# Patient Record
Sex: FEMALE | Race: WHITE | Employment: OTHER | URBAN - METROPOLITAN AREA
[De-identification: names, ages, dates, MRNs, and addresses within clinical notes are randomized per-mention and may not be internally consistent; named-entity substitution may affect disease eponyms.]

---

## 2017-01-02 ENCOUNTER — ANESTHESIA EVENT (OUTPATIENT)
Dept: CARDIAC SURGERY | Facility: HOSPITAL | Age: 67
End: 2017-01-02

## 2017-01-03 ENCOUNTER — ANESTHESIA (OUTPATIENT)
Dept: CARDIAC SURGERY | Facility: HOSPITAL | Age: 67
End: 2017-01-03

## 2017-01-03 ENCOUNTER — SURGERY (OUTPATIENT)
Age: 67
End: 2017-01-03

## 2017-01-03 NOTE — ANESTHESIA PREPROCEDURE EVALUATION
PRE-OP EVALUATION    Patient Name: Joana Esparza    Pre-op Diagnosis: varicose veins of bilateral lower extremities with pain    Procedure(s):  left lower extremity stab phlebectomy  SA av'd per office    Surgeon(s) and Role:     * Carl Traore MD - (LOTRISONE) 1-0.05 % External Cream Apply 1 Application topically 2 (two) times daily. Disp: 1 Tube Rfl: 3   aspirin 81 MG Oral Tab Take 81 mg by mouth daily. Disp:  Rfl:    Calcium Carbonate-Vitamin D (CALCIUM + D OR) Take 2 tablets by mouth daily.    Disp Available pre-op labs reviewed. Lab Results  Component Value Date   WBC 6.6 12/27/2016   WBC 7.06 11/21/2016   RBC 4.94 11/21/2016   HGB 14.4 11/21/2016   HCT 43.0 11/21/2016   MCV 87 12/27/2016   MCV 87.0 11/21/2016   MCH 28.8 12/27/2016   MCH 29. 1

## 2017-01-03 NOTE — ANESTHESIA POSTPROCEDURE EVALUATION
[address] Patient Status:  Outpatient in a Bed   Age/Gender 73year old female MRN [id number]   Location [address] [facility] Attending Ela Valente MD   Hosp Day # 0 PCP Thad Dakin, MD       Anesthesia

## 2017-01-30 PROBLEM — I83.93 SPIDER VEINS OF BOTH LOWER EXTREMITIES: Status: ACTIVE | Noted: 2017-01-30

## 2017-09-12 PROBLEM — R31.0 GROSS HEMATURIA: Status: ACTIVE | Noted: 2017-09-12

## 2017-09-12 PROBLEM — M79.5 FOREIGN BODY (FB) IN SOFT TISSUE: Status: ACTIVE | Noted: 2017-09-12

## 2017-09-12 PROCEDURE — 81001 URINALYSIS AUTO W/SCOPE: CPT | Performed by: FAMILY MEDICINE

## 2017-09-12 PROCEDURE — 87086 URINE CULTURE/COLONY COUNT: CPT | Performed by: FAMILY MEDICINE

## 2017-09-26 PROCEDURE — 81003 URINALYSIS AUTO W/O SCOPE: CPT | Performed by: FAMILY MEDICINE

## 2017-09-26 PROCEDURE — 87086 URINE CULTURE/COLONY COUNT: CPT | Performed by: FAMILY MEDICINE

## 2017-10-16 PROBLEM — D21.11: Status: ACTIVE | Noted: 2017-10-16

## 2017-10-23 PROBLEM — M79.641 RIGHT HAND PAIN: Status: ACTIVE | Noted: 2017-10-23

## 2017-11-29 PROBLEM — M65.321 TRIGGER FINGER, RIGHT INDEX FINGER: Status: ACTIVE | Noted: 2017-11-29

## 2017-11-29 PROBLEM — R20.2 RIGHT HAND PARESTHESIA: Status: ACTIVE | Noted: 2017-11-29

## 2018-01-31 PROBLEM — G56.21 ULNAR NEUROPATHY OF RIGHT UPPER EXTREMITY: Status: ACTIVE | Noted: 2018-01-31

## 2018-05-31 PROBLEM — R07.89 STERNUM PAIN: Status: ACTIVE | Noted: 2018-05-31

## 2018-05-31 PROBLEM — M25.512 ACUTE PAIN OF BOTH SHOULDERS: Status: ACTIVE | Noted: 2018-05-31

## 2018-05-31 PROBLEM — M25.511 ACUTE PAIN OF BOTH SHOULDERS: Status: ACTIVE | Noted: 2018-05-31

## 2018-05-31 PROCEDURE — 86200 CCP ANTIBODY: CPT | Performed by: FAMILY MEDICINE

## 2018-05-31 PROCEDURE — 85652 RBC SED RATE AUTOMATED: CPT | Performed by: FAMILY MEDICINE

## 2018-07-19 PROBLEM — M75.42 IMPINGEMENT SYNDROME OF BOTH SHOULDERS: Status: ACTIVE | Noted: 2018-07-19

## 2018-07-19 PROBLEM — M75.21 BICEPS TENDINITIS ON RIGHT: Status: ACTIVE | Noted: 2018-07-19

## 2018-07-19 PROBLEM — M75.111 PARTIAL TEAR OF RIGHT ROTATOR CUFF: Status: ACTIVE | Noted: 2018-07-19

## 2018-07-19 PROBLEM — M75.41 IMPINGEMENT SYNDROME OF BOTH SHOULDERS: Status: ACTIVE | Noted: 2018-07-19

## 2018-07-19 PROBLEM — M75.22 BICEPS TENDINITIS, LEFT: Status: ACTIVE | Noted: 2018-07-19

## 2018-10-16 PROBLEM — R35.0 FREQUENT URINATION: Status: ACTIVE | Noted: 2018-10-16

## 2018-10-16 PROBLEM — R10.31 RIGHT LOWER QUADRANT ABDOMINAL PAIN: Status: ACTIVE | Noted: 2018-10-16

## 2018-10-16 PROCEDURE — 87086 URINE CULTURE/COLONY COUNT: CPT | Performed by: FAMILY MEDICINE

## 2018-10-16 PROCEDURE — 81001 URINALYSIS AUTO W/SCOPE: CPT | Performed by: FAMILY MEDICINE

## 2018-11-01 PROCEDURE — 81003 URINALYSIS AUTO W/O SCOPE: CPT | Performed by: FAMILY MEDICINE

## 2019-10-21 PROBLEM — M85.88 OSTEOPENIA OF LUMBAR SPINE: Status: ACTIVE | Noted: 2019-10-21

## 2019-10-21 PROBLEM — I67.1 CEREBRAL ANEURYSM WITHOUT RUPTURE: Status: ACTIVE | Noted: 2019-10-21

## 2019-10-21 PROBLEM — I67.1 CEREBRAL ANEURYSM WITHOUT RUPTURE (HCC): Status: ACTIVE | Noted: 2019-10-21

## 2019-10-21 PROBLEM — Z92.89 HOSPITALIZATION WITHIN LAST 30 DAYS: Status: ACTIVE | Noted: 2019-10-21

## 2019-10-21 PROBLEM — G51.0 RIGHT-SIDED BELL'S PALSY: Status: ACTIVE | Noted: 2019-10-21

## 2020-10-27 PROBLEM — G40.909 SEIZURE DISORDER (HCC): Status: ACTIVE | Noted: 2020-10-27

## 2020-10-27 PROBLEM — K86.1 OTHER CHRONIC PANCREATITIS (HCC): Status: ACTIVE | Noted: 2020-10-27

## 2021-06-23 PROBLEM — K52.9 CHRONIC DIARRHEA: Status: ACTIVE | Noted: 2021-06-23

## 2021-06-23 PROBLEM — Z01.818 PRE-OP TESTING: Status: ACTIVE | Noted: 2021-06-23

## 2021-06-23 PROBLEM — K22.70 BARRETT'S ESOPHAGUS WITHOUT DYSPLASIA: Status: ACTIVE | Noted: 2021-06-23

## 2021-06-23 PROBLEM — Z01.818 PRE-OP EVALUATION: Status: ACTIVE | Noted: 2021-06-23

## 2021-12-17 PROBLEM — K11.20 SIALADENITIS: Status: ACTIVE | Noted: 2021-12-17

## 2021-12-17 PROBLEM — L03.213 PERIORBITAL CELLULITIS OF LEFT EYE: Status: ACTIVE | Noted: 2021-12-17

## 2021-12-17 PROBLEM — Z86.19 FREQUENT INFECTIONS: Status: ACTIVE | Noted: 2021-12-17

## 2022-04-07 PROBLEM — M06.4 INFLAMMATORY POLYARTHRITIS (HCC): Status: ACTIVE | Noted: 2022-04-07

## 2024-04-11 RX ORDER — CHLORDIAZEPOXIDE HYDROCHLORIDE AND CLIDINIUM BROMIDE 5; 2.5 MG/1; MG/1
1 CAPSULE ORAL
COMMUNITY

## 2024-04-11 RX ORDER — METHOTREXATE 2.5 MG/1
15 TABLET ORAL WEEKLY
COMMUNITY

## 2024-04-11 RX ORDER — FOLIC ACID 1 MG/1
1 TABLET ORAL 3 TIMES DAILY
COMMUNITY

## 2024-04-11 RX ORDER — SACCHAROMYCES BOULARDII 250 MG
250 CAPSULE ORAL 2 TIMES DAILY
COMMUNITY

## 2024-04-11 RX ORDER — CHOLESTYRAMINE 4 G/9G
4 POWDER, FOR SUSPENSION ORAL 2 TIMES DAILY
COMMUNITY

## 2024-04-11 RX ORDER — AMLODIPINE BESYLATE 5 MG/1
5 TABLET ORAL DAILY
COMMUNITY

## 2024-05-06 ENCOUNTER — ANESTHESIA (OUTPATIENT)
Dept: ENDOSCOPY | Facility: HOSPITAL | Age: 74
End: 2024-05-06
Payer: MEDICARE

## 2024-05-06 ENCOUNTER — HOSPITAL ENCOUNTER (OUTPATIENT)
Facility: HOSPITAL | Age: 74
Setting detail: HOSPITAL OUTPATIENT SURGERY
Discharge: HOME | End: 2024-05-06
Attending: INTERNAL MEDICINE | Admitting: INTERNAL MEDICINE
Payer: MEDICARE

## 2024-05-06 ENCOUNTER — ANESTHESIA EVENT (OUTPATIENT)
Dept: ENDOSCOPY | Facility: HOSPITAL | Age: 74
End: 2024-05-06
Payer: MEDICARE

## 2024-05-06 VITALS
DIASTOLIC BLOOD PRESSURE: 50 MMHG | RESPIRATION RATE: 16 BRPM | SYSTOLIC BLOOD PRESSURE: 107 MMHG | BODY MASS INDEX: 21.62 KG/M2 | WEIGHT: 103 LBS | HEART RATE: 62 BPM | OXYGEN SATURATION: 98 % | TEMPERATURE: 98 F | HEIGHT: 58 IN

## 2024-05-06 PROCEDURE — 88305 TISSUE EXAM BY PATHOLOGIST: CPT | Performed by: INTERNAL MEDICINE

## 2024-05-06 PROCEDURE — 0DBE8ZX EXCISION OF LARGE INTESTINE, VIA NATURAL OR ARTIFICIAL OPENING ENDOSCOPIC, DIAGNOSTIC: ICD-10-PCS | Performed by: INTERNAL MEDICINE

## 2024-05-06 RX ORDER — SODIUM CHLORIDE, SODIUM LACTATE, POTASSIUM CHLORIDE, CALCIUM CHLORIDE 600; 310; 30; 20 MG/100ML; MG/100ML; MG/100ML; MG/100ML
INJECTION, SOLUTION INTRAVENOUS CONTINUOUS
Status: DISCONTINUED | OUTPATIENT
Start: 2024-05-06 | End: 2024-05-06

## 2024-05-06 RX ORDER — NALOXONE HYDROCHLORIDE 0.4 MG/ML
0.08 INJECTION, SOLUTION INTRAMUSCULAR; INTRAVENOUS; SUBCUTANEOUS ONCE AS NEEDED
Status: DISCONTINUED | OUTPATIENT
Start: 2024-05-06 | End: 2024-05-06

## 2024-05-06 RX ORDER — LIDOCAINE HYDROCHLORIDE 10 MG/ML
INJECTION, SOLUTION EPIDURAL; INFILTRATION; INTRACAUDAL; PERINEURAL AS NEEDED
Status: DISCONTINUED | OUTPATIENT
Start: 2024-05-06 | End: 2024-05-06 | Stop reason: SURG

## 2024-05-06 RX ORDER — ONDANSETRON 2 MG/ML
4 INJECTION INTRAMUSCULAR; INTRAVENOUS ONCE AS NEEDED
Status: DISCONTINUED | OUTPATIENT
Start: 2024-05-06 | End: 2024-05-06

## 2024-05-06 RX ADMIN — LIDOCAINE HYDROCHLORIDE 20 MG: 10 INJECTION, SOLUTION EPIDURAL; INFILTRATION; INTRACAUDAL; PERINEURAL at 09:05:00

## 2024-05-06 RX ADMIN — SODIUM CHLORIDE, SODIUM LACTATE, POTASSIUM CHLORIDE, CALCIUM CHLORIDE: 600; 310; 30; 20 INJECTION, SOLUTION INTRAVENOUS at 09:14:00

## 2024-05-06 RX ADMIN — SODIUM CHLORIDE, SODIUM LACTATE, POTASSIUM CHLORIDE, CALCIUM CHLORIDE: 600; 310; 30; 20 INJECTION, SOLUTION INTRAVENOUS at 09:02:00

## 2024-05-06 NOTE — DISCHARGE INSTRUCTIONS
Home Care Instructions for Colonoscopy with Sedation    Diet:  - Resume your regular diet   - Start with light meals to minimize bloating.  - Do not drink alcohol today.    Medication:  - If you have questions about resuming your normal medications, please contact your Primary Care Physician.    Activities:  - Take it easy today. Do not return to work today.  - Do not drive today.  - Do not operate any machinery today (including kitchen equipment).    Colonoscopy:  - You may notice some rectal \"spotting\" (a little blood on the toilet tissue) for a day or two after the exam. This is normal.  - If you experience any rectal bleeding (not spotting), persistent tenderness or sharp severe abdominal pains, oral temperature over 100 degrees Fahrenheit, light-headedness or dizziness, or any other problems, contact your doctor.    **If unable to reach your doctor, please go to the [facility]**    - Your referring physician will receive a full report of your examination.  - If you do not hear from your doctor's office within two weeks of your biopsy, please call them for your results.

## 2024-05-06 NOTE — ANESTHESIA POSTPROCEDURE EVALUATION
[facility]    Venus Wan Patient Status:  Hospital Outpatient Surgery   Age/Gender 74 year old female MRN [id number]   Location [facility] Attending Jagdish Mayberry MD   Hosp Day # 0 PCP Bertha Ponce MD       Anesthesia Post-op Note    COLONOSCOPY with biopsies    Procedure Summary       Date: 05/06/24 Room / Location:  ENDOSCOPY 03 / [facility] ENDOSCOPY    Anesthesia Start: 0902 Anesthesia Stop: 0918    Procedure: COLONOSCOPY with biopsies Diagnosis: (Normal)    Surgeons: Jagdish Mayberry MD Anesthesiologist: Mario Alberto Hooper MD    Anesthesia Type: MAC ASA Status: 3            Anesthesia Type: MAC    Vitals Value Taken Time   /62 05/06/24 0918   Temp 98.3 05/06/24 0918   Pulse 66 05/06/24 0918   Resp 12 05/06/24 0918   SpO2 100 05/06/24 0918       Patient Location: Endoscopy    Anesthesia Type: MAC    Airway Patency: patent    Postop Pain Control: adequate    Mental Status: preanesthetic baseline    Nausea/Vomiting: none    Cardiopulmonary/Hydration status: stable euvolemic    Complications: no apparent anesthesia related complications    Postop vital signs: stable    Dental Exam: Unchanged from Preop    Patient to be discharged from PACU when criteria met.

## 2024-05-06 NOTE — OPERATIVE REPORT
OPERATIVE REPORT   PATIENT NAME: Venus Wan  MRN: [id number]  DATE OF OPERATION: 5/6/2024  PREOPERATIVE DIAGNOSIS: diarrhea  POSTOPERATIVE DIAGNOSES   Normal colon, terminal ileum  PROCEDURE PERFORMED: Colonoscopy to cecum and ileum with biopsies  SCOPE UTILIZED: Pediatric Olympus Colonoscope  SEDATION MEDICATIONS: MAC   CECAL WITHDRAWAL TIME= 7 mins  DURATION of CONSCIOUS SEDATION: deep sedation provided by anesthesiologist  PREPROCEDURE ASSESSMENT: The indication for this procedure is to assess for colitis. The patient was identified by myself and nursing staff in the exam room. Informed consent was obtained. The patient was seen in clinic and a full H&P was obtained. On brief physical examination, airway is patent. Chest is clear. Heart has regular rate and rhythm. Abdomen is soft, nontender with good bowel sounds. A medication list was taken by nursing today and reviewed by myself. The patient is an ASA grade 2.  Due to the technical nature of the procedure, pathology of the anal area could be missed.  PROCEDURE NOTE: The procedure was completed without difficulty. The patient tolerated the procedure well. The prep was good.  The colonoscope was inserted through the anus and advanced to the level of the cecum with visualization and photo documentation of the appendix. A slow withdrawal of the colonoscope was performed as well as retroflexion in the rectum.  Terminal ileum was intubated and visualized for 10cms and appeared normal. No polyps, masses or lesions were found throughout the colon.  Random colon biopsies were taken. Small internal hemorrhoids were noted.  There were no immediate complications.   FINDINGS   normal  RECOMMENDATIONS: Repeat colonoscopy in 10 years.  DISCHARGE: The patient was given an after visit summary detailing the procedure, findings, recommendations, f/u plan and an updated medication list.   PREP Quality indicators:  Aronchick scale    EXCELLENT - small volume of clear liquid >  95% of mucosa see  GOOD  - clear liquid covering up to 25% of mucosa, but > 90% of mucosa seen  FAIR  - some semisolid stool could be suctioned but > 90% of mucosa seen  POOR  - semisolid stool could not be suctioned and < 90% of mucosal seen  INADEQUATE- repeat preparation needed      Thank you very much for the consultation.  I really appreciate it.    Jagdish Mayberry MD

## 2024-05-06 NOTE — ANESTHESIA PREPROCEDURE EVALUATION
PRE-OP EVALUATION    Patient Name: Venus Wan    Admit Diagnosis: HISTORY OF SMALL BOWEL OBSTRUCTION; EXOCRINE PANCREATIC INSUFFICIENCY; DIARRHEA, UNSPECIFIED TYPE    Pre-op Diagnosis: HISTORY OF SMALL BOWEL OBSTRUCTION; EXOCRINE PANCREATIC INSUFFICIENCY; DIARRHEA, UNSPECIFIED TYPE    COLONOSCOPY    Anesthesia Procedure: COLONOSCOPY    Surgeons and Role:     * Jagdish Mayberry MD - Primary    Pre-op vitals reviewed.  Temp: 97.8 °F (36.6 °C)  Pulse: 76  Resp: 18  BP: 138/66  SpO2: 100 %  Body mass index is 21.53 kg/m².    Current medications reviewed.  Hospital Medications:   lactated ringers infusion   Intravenous Continuous       Outpatient Medications:     Medications Prior to Admission   Medication Sig Dispense Refill Last Dose    amLODIPine 5 MG Oral Tab Take 1 tablet (5 mg total) by mouth daily.   Past Week    folic acid 1 MG Oral Tab Take 1 tablet (1 mg total) by mouth in the morning, at noon, and at bedtime.   Past Week    methotrexate 2.5 MG Oral Tab Take 6 tablets (15 mg total) by mouth once a week. Friday- 3 (2.5 mg tabs) in the am, and 3 tabs in the pm   5/3/2024    saccharomyces boulardii 250 MG Oral Cap Take 1 capsule (250 mg total) by mouth 2 (two) times daily.   5/3/2024    chlordiazepoxide-clidinium 5-2.5 MG Oral Cap Take 1 capsule by mouth 4 (four) times daily before meals and nightly.   Past Week    Cholestyramine 4 g Oral Powd Pack Take 1 packet by mouth 2 (two) times daily. 1 pack per day for 1st week, then twice daily after that   5/3/2024    nitroGLYCERIN 2 % Transdermal Ointment Apply 0.5 inch to base of digits as needed. Use on digits with ulcers at tips. 60 g 1 Past Week    ascorbic acid 100 MG Oral Tab Take 1 tablet (100 mg total) by mouth daily.   Past Week    ramipril 2.5 MG Oral Cap TAKE ONE BY MOUTH DAILY 90 capsule 3 5/3/2024    atorvastatin 10 MG Oral Tab Take 1 tablet (10 mg total) by mouth nightly. 90 tablet 3 5/3/2024    levETIRAcetam 500 MG Oral Tab Take 1 tablet (500 mg  total) by mouth 2 (two) times daily.   5/6/2024    Coenzyme Q10 (CO Q-10) 100 MG Oral Cap  30 capsule 0 Past Week    Pantoprazole Sodium 40 MG Oral Tab EC Take 1 tablet (40 mg total) by mouth daily. 30 tablet 0 Past Week    clotrimazole-betamethasone (LOTRISONE) 1-0.05 % External Cream Apply 1 Application topically 2 (two) times daily. 1 Tube 3 Past Week    aspirin 81 MG Oral Tab Take 1 tablet (81 mg total) by mouth daily.   4/29/2024 at 10p    Calcium Carbonate-Vitamin D (CALCIUM + D OR) Take 2 tablets by mouth daily.     Past Week    Cyanocobalamin (VITAMIN B 12 OR) Take 1 tablet by mouth daily.     Past Week    adalimumab 80 MG/0.8ML Subcutaneous Pen-injector Kit Inject 40 mg into the skin every 14 (fourteen) days. Has not started yet.   not started yet       Allergies: Penicillins, Adhesive tape, Latex, and Other      Anesthesia Evaluation  Patient Active Problem List   Diagnosis    Benign essential hypertension    Chronic coronary artery disease    Fibrocystic breast changes    Hyperlipidemia    Menopausal and postmenopausal disorder    Osteoarthritis    Acquired cyst of kidney    Raynaud's phenomenon without gangrene    Atopic rhinitis    Mass of breast    Menopause present    Varicose veins of bilateral lower extremities with pain    Venous insufficiency (chronic) (peripheral)    Spider veins of both lower extremities    Gross hematuria    Foreign body (FB) in soft tissue    Benign neoplasm of soft tissue of right hand    Right hand pain    Right hand paresthesia    Trigger finger, right index finger    Ulnar neuropathy of right upper extremity    Sternum pain    Acute pain of both shoulders    Biceps tendinitis, left    Biceps tendinitis on right    Impingement syndrome of both shoulders    Partial tear of right rotator cuff    Right lower quadrant abdominal pain    Frequent urination    Cerebral aneurysm without rupture (HCC)    Right-sided Bell's palsy    Osteopenia of lumbar spine    Hospitalization  within last 30 days    Other chronic pancreatitis (HCC)    Seizure disorder (HCC)    Chronic diarrhea    Pre-op evaluation    Pre-op testing    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    Periorbital cellulitis of left eye    Sialadenitis    Frequent infections    Inflammatory polyarthritis (HCC)        Past Medical History:    Acquired cyst of kidney    Description: small cst in [place] - not well visualized on MRI of abdomen     Benign essential hypertension    Brain aneurysm (HCC)    small    Cataract    Cellulitis    Chronic coronary artery disease    Crohn's disease (HCC)    Deep vein thrombosis (HCC)    left thigh    Fibrocystic breast changes    High blood pressure    High cholesterol    Hyperlipidemia    Ischemic colitis (HCC)    Menopausal and postmenopausal disorder    Migraines    Osteoarthritis    Pancreas divisum    Pancreatic divisum    Raynaud's disease    Reflux esophagitis    Seizure (HCC)    Seizure disorder (HCC)    Spider veins of both lower extremities    Varicose veins    Vertigo    Visual impairment    glasses          Past Surgical History:   Procedure Laterality Date    Appendectomy      Appendectomy      Benign biopsy left      Benign biopsy right  ,,,    Breast surgery            Cholecystectomy      Colectomy      hemicolectomy- ([place])    Endoscopic ultrasound - internal      Endovenous laser vein addon      left leg-    Hand/finger surgery unlisted      Hysterectomy      Other surgical history      rotator cuff surgery    Stab phlebectomy, varicose veins, 1 extremity; <20 incisions      Trigger finger release Bilateral     Vein ligation and stripping       Social History     Socioeconomic History    Marital status:    Tobacco Use    Smoking status: Never    Smokeless tobacco: Never   Vaping Use    Vaping status: Never Used   Substance and Sexual Activity    Alcohol use: No     Alcohol/week: 0.0 standard drinks of alcohol    Drug use: No      History   Drug Use No     Available pre-op labs reviewed.               Airway      Mallampati: II  Mouth opening: >3 FB  TM distance: 4 - 6 cm  Neck ROM: full Cardiovascular      Rhythm: regular  Rate: normal     Dental    Dentition appears grossly intact         Pulmonary    Pulmonary exam normal.  Breath sounds clear to auscultation bilaterally.               Other findings              ASA: 3   Plan: MAC  NPO status verified and     Post-procedure pain management plan discussed with surgeon and patient.    Comment: Plan is MAC anesthesia, which likely will include deep sedation.  Implied that memory of procedure is unlikely although intraop recall, if it occurs, may be a reasonable and comfortable experience with this anesthetic.  Aware that general anesthesia is not intended though deep sedation may include brief moments of general anesthesia.   Questions answered. Accepts. The consent was signed without further questions.   Plan/risks discussed with: patient  Use of blood product(s) discussed with: patient    Consented to blood products.          Present on Admission:  **None**

## 2024-05-06 NOTE — H&P
History & Physical Examination    Patient Name: Venus Wan  MRN: [id number]  CSN: [id number]  YOB: 1950    Diagnosis: HISTORY OF SMALL BOWEL OBSTRUCTION; EXOCRINE PANCREATIC INSUFFICIENCY; DIARRHEA, UNSPECIFIED TYPE      Present Illness:  Venus Wan is a 74 year old female is here HISTORY OF SMALL BOWEL OBSTRUCTION; EXOCRINE PANCREATIC INSUFFICIENCY; DIARRHEA, UNSPECIFIED TYPE.    Body mass index is 21.53 kg/m².    Past Medical History:    Acquired cyst of kidney    Description: small cst in [place] - not well visualized on MRI of abdomen     Benign essential hypertension    Brain aneurysm (HCC)    small    Cataract    Cellulitis    Chronic coronary artery disease    Crohn's disease (HCC)    Deep vein thrombosis (HCC)    left thigh    Fibrocystic breast changes    High blood pressure    High cholesterol    Hyperlipidemia    Ischemic colitis (HCC)    Menopausal and postmenopausal disorder    Migraines    Osteoarthritis    Pancreas divisum    Pancreatic divisum    Raynaud's disease    Reflux esophagitis    Seizure (HCC)    Seizure disorder (HCC)    Spider veins of both lower extremities    Varicose veins    Vertigo    Visual impairment    glasses       Procedure: colonoscopyu    Physician Pre-Sedation Assessment    Pre-Sedation Assessment:    Sedation History: Airway Assessed    ASA Classification: 2. Patient with mild systemic disease    Cardiac:    Respiratory:    Abdomen:      Plan: MAC        Current Facility-Administered Medications   Medication Dose Route Frequency    lactated ringers infusion   Intravenous Continuous       Allergies:   Allergies   Allergen Reactions    Penicillins HIVES     Difficulty breathing, swelling, hives    Adhesive Tape OTHER (SEE COMMENTS)     blisters    Latex OTHER (SEE COMMENTS)     blisters    Other OTHER (SEE COMMENTS)     Allergy to Steri-Strips - Gets blisters       Past Surgical History:   Procedure Laterality Date    Appendectomy      Appendectomy       Benign biopsy left      Benign biopsy right  ,,,    Breast surgery            Cholecystectomy      Colectomy      hemicolectomy- ([place])    Endoscopic ultrasound - internal      Endovenous laser vein addon      left leg-    Hand/finger surgery unlisted      Hysterectomy      Other surgical history      rotator cuff surgery    Stab phlebectomy, varicose veins, 1 extremity; <20 incisions      Trigger finger release Bilateral     Vein ligation and stripping       Family History   Problem Relation Age of Onset    Heart Disease Other      Social History     Tobacco Use    Smoking status: Never    Smokeless tobacco: Never   Substance Use Topics    Alcohol use: No     Alcohol/week: 0.0 standard drinks of alcohol       SYSTEM Check if Review is Normal Check if Physical Exam is Normal If not normal, please explain:   HEENT [x ] [x ]    NECK & BACK [x ] [x ]    HEART [x ] [x ]    LUNGS [x ] [x ]    ABDOMEN [x ] [x ]    UROGENITAL [ ] [ ]    EXTREMITIES [ ] [ ]    OTHER        [ x ] I have discussed the risks and benefits and alternatives with the patient/family.  They understand and agree to proceed with plan of care.  [ x ] I have reviewed the History and Physical done within the last 30 days.  Any changes noted above.    Jagdish Mayberry MD  2024  8:51 AM

## 2024-11-06 ENCOUNTER — HOSPITAL ENCOUNTER (OUTPATIENT)
Dept: GENERAL RADIOLOGY | Facility: HOSPITAL | Age: 74
Discharge: HOME | End: 2024-11-06
Attending: OTOLARYNGOLOGY
Payer: MEDICARE

## 2024-11-06 DIAGNOSIS — J37.0 CHRONIC LARYNGITIS: ICD-10-CM

## 2024-11-06 DIAGNOSIS — R13.12 OROPHARYNGEAL DYSPHAGIA: ICD-10-CM

## 2024-11-06 DIAGNOSIS — R49.0 HOARSENESS: ICD-10-CM

## 2024-11-06 PROCEDURE — 74230 X-RAY XM SWLNG FUNCJ C+: CPT | Performed by: OTOLARYNGOLOGY

## 2024-11-06 PROCEDURE — 92611 MOTION FLUOROSCOPY/SWALLOW: CPT

## 2024-11-06 NOTE — PROGRESS NOTES
ADULT VIDEOFLUOROSCOPIC SWALLOWING STUDY    Admission Date: 11/6/2024  Evaluation Date: 11/06/24  Radiologist: Ramiro    RECOMMENDATIONS   Diet Recommendations - Solids: Regular  Diet Recommendations - Liquids: Thin Liquids    Further Follow-up: Follow-up with PCP or referring physician as instructed.              Medication Administration Recommendations: No restrictions       HISTORY   Background/Objective Information: Patient is a 73 y/o female referred for outpatient VFSS. Patient denied history of dysphagia symptoms and reported consuming a regular diet and thin liquids at baseline. 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    Problem List  Active Problems:    * No active hospital problems. *      Past Medical History  Past Medical History:    Acquired cyst of kidney    Description: small cst in [place] - not well visualized on MRI of abdomen     Benign essential hypertension    Brain aneurysm (HCC)    small    Cataract    Cellulitis    Chronic coronary artery disease    Crohn's disease (HCC)    Deep vein thrombosis (HCC)    left thigh    Fibrocystic breast changes    High blood pressure    High cholesterol    Hyperlipidemia    Ischemic colitis (HCC)    Menopausal and postmenopausal disorder    Migraines    Osteoarthritis    Pancreas divisum    Pancreatic divisum    Raynaud's disease    Reflux esophagitis    Seizure (HCC)    Seizure disorder (HCC)    Spider veins of both lower extremities    Varicose veins    Vertigo    Visual impairment    glasses       Current Diet Consistency: Regular;Thin liquids        History of Recent: No recent respiratory difficulty     Imaging results: NA         Family/Patient Goals: None stated     ASSESSMENT   DYSPHAGIA ASSESSMENT  Test completed in conjunction with Radiologist.  Patient Positioned: Upright;Midline.  Patient Viewed: Lateral.   .  Consistencies Presented: Thin liquids;Puree;Hard solid to assess oropharyngeal swallow function and assess for  compensatory strategies to improve safe swallow function.    THIN LIQUIDS  Method of Presentation: Cup  Oral Phase of Swallow (VFSS - Thin Liquids): Within Functional Limits  Triggered at: Base of tongue  Laryngeal Penetration: Transient  Tracheal Aspiration: None        PUREE  Oral Phase of Swallow (VFSS - Puree): Within Functional Limits  Triggered at: Base of tongue  Laryngeal Penetration: None  Tracheal Aspiration: None     HARD SOLID  Oral Phase of Swallow (VFSS - Hard Solid): Within Functional Limits  Triggered at: Base of tongue  Laryngeal Penetration: None  Tracheal Aspiration: None  Penetration Aspiration Scale Score: Score 2: Material enters the airway, remains above the vocal folds, and is ejected from the airway       Overall Impression:   Patient presented with an intact oropharyngeal swallow. Bolus acceptance was adequate without evidence of anterior bolus loss. Mastication and AP bolus transit were thorough and efficient without evidence of oral residue. Pharyngeal swallow initiation was timely. Base of tongue retraction, epiglottic inversion, and hyolaryngeal excursion were WFL. Flash penetration observed with thin liquids which cleared with cessation of swallow. No aspiration observed. No significant pharyngeal residue appreciated.    Recommend patient continue a regular diet and thin liquids. No further dysphagia therapy warranted at this time as no deficits identified which require skilled intervention. Patient may benefit from outpatient SLP evaluation for dysphonia. Education provided re: results and recommendations. All questions answered to apparent satisfaction.                EDUCATION/INSTRUCTION  Reviewed results and recommendations with patient/family/caregiver.  Agreement/Understanding verbalized and all questions answered to their apparent satisfaction.    INTERDISCIPLINARY COMMUNICATION  Reviewed results with Radiologist; agreement verbalized.    Patient Experiencing Pain: No                 Thank you for your referral.   If you have any questions, please contact JOSELUIS Anne

## (undated) DEVICE — GIJAW SINGLE-USE BIOPSY FORCEPS WITH NEEDLE: Brand: GIJAW

## (undated) DEVICE — 1200CC GUARDIAN II: Brand: GUARDIAN

## (undated) DEVICE — GLOVE,SURG,SENSICARE,ALOE,LF,PF,7: Brand: MEDLINE

## (undated) DEVICE — KIT VLV 5 PC AIR H2O SUCT BX ENDOGATOR CONN

## (undated) DEVICE — 10FT COMBINED O2 DELIVERY/CO2 MONITORING. FILTER WITH MICROSTREAM TYPE LUER: Brand: DUAL ADULT NASAL CANNULA

## (undated) DEVICE — 3M™ RED DOT™ MONITORING ELECTRODE WITH FOAM TAPE AND STICKY GEL, 50/BAG, 20/CASE, 72/PLT 2570: Brand: RED DOT™

## (undated) DEVICE — KIT CUSTOM ENDOPROCEDURE STERIS